# Patient Record
Sex: FEMALE | URBAN - METROPOLITAN AREA
[De-identification: names, ages, dates, MRNs, and addresses within clinical notes are randomized per-mention and may not be internally consistent; named-entity substitution may affect disease eponyms.]

---

## 2021-02-10 ENCOUNTER — OFFICE VISIT (OUTPATIENT)
Dept: URBAN - METROPOLITAN AREA CLINIC 48 | Facility: CLINIC | Age: 26
End: 2021-02-10
Payer: COMMERCIAL

## 2021-02-10 DIAGNOSIS — H33.322 ROUND HOLE OF RETINA OF LEFT EYE: Primary | ICD-10-CM

## 2021-02-10 PROCEDURE — 99204 OFFICE O/P NEW MOD 45 MIN: CPT | Performed by: OPHTHALMOLOGY

## 2021-02-10 PROCEDURE — 92134 CPTRZ OPH DX IMG PST SGM RTA: CPT | Performed by: OPHTHALMOLOGY

## 2021-02-10 ASSESSMENT — INTRAOCULAR PRESSURE
OS: 16
OD: 17

## 2021-02-10 NOTE — IMPRESSION/PLAN
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 Left. Plan: OCT's ordered and performed today. Discussed diagnosis in detail with patient. No treatment is required at this time. Advised patient of condition. Reassured patient of current condition and recommend yearly follow up exams with Dr. Patric Corrigan. Patient understands and agrees with the plan.